# Patient Record
Sex: FEMALE | Race: WHITE | NOT HISPANIC OR LATINO | URBAN - METROPOLITAN AREA
[De-identification: names, ages, dates, MRNs, and addresses within clinical notes are randomized per-mention and may not be internally consistent; named-entity substitution may affect disease eponyms.]

---

## 2017-12-04 ENCOUNTER — OFFICE VISIT - HEALTHEAST (OUTPATIENT)
Dept: FAMILY MEDICINE | Facility: CLINIC | Age: 37
End: 2017-12-04

## 2017-12-04 DIAGNOSIS — J02.9 SORE THROAT: ICD-10-CM

## 2017-12-04 DIAGNOSIS — N39.0 UTI (URINARY TRACT INFECTION): ICD-10-CM

## 2017-12-04 DIAGNOSIS — R30.0 DYSURIA: ICD-10-CM

## 2017-12-04 ASSESSMENT — MIFFLIN-ST. JEOR: SCORE: 2026.2

## 2019-05-02 ENCOUNTER — COMMUNICATION - HEALTHEAST (OUTPATIENT)
Dept: TELEHEALTH | Facility: CLINIC | Age: 39
End: 2019-05-02

## 2019-05-02 ENCOUNTER — OFFICE VISIT - HEALTHEAST (OUTPATIENT)
Dept: FAMILY MEDICINE | Facility: CLINIC | Age: 39
End: 2019-05-02

## 2019-05-02 DIAGNOSIS — L08.9 FINGER INFECTION: ICD-10-CM

## 2019-05-02 RX ORDER — BETAMETHASONE DIPROPIONATE 0.5 MG/G
CREAM TOPICAL
Qty: 15 G | Refills: 1 | Status: SHIPPED | OUTPATIENT
Start: 2019-05-02

## 2021-05-27 ENCOUNTER — RECORDS - HEALTHEAST (OUTPATIENT)
Dept: ADMINISTRATIVE | Facility: CLINIC | Age: 41
End: 2021-05-27

## 2021-05-28 NOTE — PROGRESS NOTES
"Assessment/Plan:    1. Finger infection  Left ring finger infection noted.  Evidence for furuncle.  Cephalexin 500 mg 4 times daily x10 days.  Unable to I&D currently.  Wound culture not able to be obtained.  Notify if worsening or nonimprovement.   - cephalexin (KEFLEX) 500 MG capsule; Take 1 capsule (500 mg total) by mouth 4 (four) times a day for 10 days.  Dispense: 40 capsule; Refill: 0    2. Right lateral hand dermatitis  Hyperpigmentation of skin of bilateral hands following recent squeezing of limes for \"scratch margaritas\".  Sun exposure.  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  Betamethasone dipropionate 0.05% cream sparingly twice daily as needed.  - betamethasone dipropionate (DIPROLENE) 0.05 % cream; apply topically to affected areas twice daily as needed  Dispense: 15 g; Refill: 1      Subjective:    Kristen Patel is seen today for hand rash.  Bilateral.  Symptoms over past 1 to 2 weeks.  Was out in [place].  Was making margaritas from scratch.  With squeezing limes and then exposed to sun.  No fevers or chills.  We could not get left ring finger ring off.  No change in jewelry.  No drainage.  No other trauma.  No history of similar concerns.  Past medical social family history reviewed and noted below.  Comprehensive review of systems as above otherwise all negative.     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 No pertinent surgical history.     No family history on file.     History reviewed. No pertinent past medical history.     Social History     Tobacco Use     Smoking status: Current Every Day Smoker     Packs/day: 0.25     Types: Cigarettes     Smokeless tobacco: Never Used   Substance Use Topics     Alcohol use: Yes     Alcohol/week: 2.5 oz     Types: 5 Standard drinks or equivalent per week     Drug use: No        Current Outpatient Medications "   Medication Sig Dispense Refill     betamethasone dipropionate (DIPROLENE) 0.05 % cream apply topically to affected areas twice daily as needed 15 g 1     cephalexin (KEFLEX) 500 MG capsule Take 1 capsule (500 mg total) by mouth 4 (four) times a day for 10 days. 40 capsule 0     No current facility-administered medications for this visit.           Objective:    Vitals:    05/02/19 1521   BP: 110/70   Pulse: (!) 59   SpO2: 98%   Weight: (!) 297 lb (134.7 kg)      Body mass index is 43.86 kg/m .    Alert.  No apparent distress.  Left ring finger with swelling.  Area of furuncle likely just proximal to PIP joint.  Also has significant skin dermatitis bilateral hands with hyperpigmentation changes with question of phytophotodermatitis findings.  No vesicular eruption.  Neurologic exam benign.      This note has been dictated using voice recognition software and as a result may contain minor grammatical errors and unintended word substitutions.

## 2021-05-31 VITALS — WEIGHT: 287 LBS | BODY MASS INDEX: 42.51 KG/M2 | HEIGHT: 69 IN

## 2021-06-03 VITALS — BODY MASS INDEX: 43.86 KG/M2 | WEIGHT: 293 LBS

## 2021-06-14 NOTE — PROGRESS NOTES
Assessment:     1. Sore throat  Rapid Strep A Screen-Throat    Group A Strep, RNA Direct Detection, Throat   2. Dysuria  Urinalysis-UC if Indicated    ciprofloxacin HCl (CIPRO) 500 MG tablet   3. UTI (urinary tract infection)  ciprofloxacin HCl (CIPRO) 500 MG tablet       Plan:     1. Sore throat  Patient has already completed a course of antibiotics prescribed by a physician in [place] strep was negative today  - Rapid Strep A Screen-Throat  - Group A Strep, RNA Direct Detection, Throat    2. Dysuria  Urine however is positive for nitrates and patient is symptomatic with urgency frequency dysuria we will treat with Cipro for 5 days   - Urinalysis-UC if Indicated  - ciprofloxacin HCl (CIPRO) 500 MG tablet; Take 1 tablet (500 mg total) by mouth 2 (two) times a day for 10 days.  Dispense: 10 tablet; Refill: 0    3. UTI (urinary tract infection)  Treatment as above  - ciprofloxacin HCl (CIPRO) 500 MG tablet; Take 1 tablet (500 mg total) by mouth 2 (two) times a day for 10 days.  Dispense: 10 tablet; Refill: 0      Subjective:   Patient is being seen today for follow-up and a new complaint.  She previously had a sore throat swollen lymph nodes and some sinus congestion was seen by physician out in [place] was given amoxicillin 500 mg a corticosteroid pack and recovered but still has a residual sore throat.  Her strep test was negative in [place].  Today her repeat study also was negative.  However today she is complaining of urgency frequency dysuria exam reveals some mild suprapubic tenderness no CVA tenderness urine was positive for nitrates medical decision making was to treat the patient with a course of Cipro twice daily for 5 days.  She is to increase fluids and to try to acidify her urine with cranberry products.  All medical questions that were asked were answered I personally reviewed family social history surgeries allergies problems list follow-up if no improvement.  First visit with this patient.    Review  "of Systems: A complete 14 point review of systems was obtained and is negative or as stated in the history of present illness.    No past medical history on file.  No family history on file.  No past surgical history on file.  Social History   Substance Use Topics     Smoking status: Current Every Day Smoker     Packs/day: 0.25     Types: Cigarettes     Smokeless tobacco: Not on file     Alcohol use 2.5 oz/week     5 drink(s) per week         Objective:   /62 (Patient Site: Left Arm, Patient Position: Sitting, Cuff Size: Adult Large)  Pulse 74  Temp 98.7  F (37.1  C)  Ht 5' 9\" (1.753 m)  Wt (!) 287 lb (130.2 kg)  SpO2 98%  BMI 42.38 kg/m2    General Appearance:  Alert, cooperative, no distress  Head:  Normocephalic, no obvious abnormality  Ears: TM anatomy normal  Eyes:  PERRL, EOM's intact, conjunctiva and corneas clear  Nose:  Nares symmetrical, septum midline, mucosa pink, no sinus tenderness  Throat:  Lips, tongue, and mucosa are moist, pink, and intact  Neck:  Supple, symmetrical, trachea midline, no adenopathy; thyroid: no enlargement, symmetric,no tenderness/mass/nodules; no carotid bruit, no JVD  Back:  Symmetrical, no curvature, ROM normal, no CVA tenderness  Chest/Breast:  No mass or tenderness  Lungs:  Clear to auscultation bilaterally, respirations unlabored   Heart:  Normal PMI, regular rate & rhythm, S1 and S2 normal, no murmurs, rubs, or gallops  Abdomen:  Soft, non-tender, bowel sounds active all four quadrants, no mass, or organomegaly  Musculoskeletal:  Tone and strength strong and symmetrical, all extremities  Lymphatic:  No adenopathy  Skin/Hair/Nails:  Skin warm, dry, and intact, no rashes  Neurologic:  Alert and oriented x3, no cranial nerve deficits, normal strength and tone, gait steady  Extremities:  No edema.  Diann's sign negative.    Genitourinary: deferred  Pulses:  Equal bilaterally           This note has been dictated using voice recognition software. Any grammatical or " context distortions are unintentional and inherent to the the software.  Clinically printed to be nice